# Patient Record
Sex: FEMALE | Race: ASIAN | URBAN - METROPOLITAN AREA
[De-identification: names, ages, dates, MRNs, and addresses within clinical notes are randomized per-mention and may not be internally consistent; named-entity substitution may affect disease eponyms.]

---

## 2019-05-14 ENCOUNTER — HOSPITAL ENCOUNTER (OUTPATIENT)
Dept: LAB | Age: 34
Discharge: HOME | End: 2019-05-14
Payer: COMMERCIAL

## 2019-05-14 ENCOUNTER — OFFICE VISIT (OUTPATIENT)
Dept: FAMILY MEDICINE CLINIC | Age: 34
End: 2019-05-14

## 2019-05-14 VITALS
RESPIRATION RATE: 17 BRPM | HEART RATE: 54 BPM | OXYGEN SATURATION: 99 % | BODY MASS INDEX: 22.47 KG/M2 | SYSTOLIC BLOOD PRESSURE: 105 MMHG | TEMPERATURE: 98.2 F | DIASTOLIC BLOOD PRESSURE: 69 MMHG | WEIGHT: 119 LBS | HEIGHT: 61 IN

## 2019-05-14 DIAGNOSIS — Z00.00 ANNUAL PHYSICAL EXAM: Primary | ICD-10-CM

## 2019-05-14 DIAGNOSIS — Z00.00 ANNUAL PHYSICAL EXAM: ICD-10-CM

## 2019-05-14 DIAGNOSIS — G57.00 PIRIFORMIS SYNDROME, UNSPECIFIED LATERALITY: ICD-10-CM

## 2019-05-14 LAB
BASOPHILS # BLD: 0 K/UL (ref 0–0.1)
BASOPHILS NFR BLD: 1 % (ref 0–2)
DIFFERENTIAL METHOD BLD: ABNORMAL
EOSINOPHIL # BLD: 0.4 K/UL (ref 0–0.4)
EOSINOPHIL NFR BLD: 7 % (ref 0–5)
ERYTHROCYTE [DISTWIDTH] IN BLOOD BY AUTOMATED COUNT: 13.7 % (ref 11.6–14.5)
HCT VFR BLD AUTO: 38.7 % (ref 35–45)
HGB BLD-MCNC: 12.6 G/DL (ref 12–16)
LYMPHOCYTES # BLD: 1.7 K/UL (ref 0.9–3.6)
LYMPHOCYTES NFR BLD: 34 % (ref 21–52)
MCH RBC QN AUTO: 31.1 PG (ref 24–34)
MCHC RBC AUTO-ENTMCNC: 32.6 G/DL (ref 31–37)
MCV RBC AUTO: 95.6 FL (ref 74–97)
MONOCYTES # BLD: 0.4 K/UL (ref 0.05–1.2)
MONOCYTES NFR BLD: 9 % (ref 3–10)
NEUTS SEG # BLD: 2.4 K/UL (ref 1.8–8)
NEUTS SEG NFR BLD: 49 % (ref 40–73)
PLATELET # BLD AUTO: 213 K/UL (ref 135–420)
PMV BLD AUTO: 9.8 FL (ref 9.2–11.8)
RBC # BLD AUTO: 4.05 M/UL (ref 4.2–5.3)
WBC # BLD AUTO: 4.8 K/UL (ref 4.6–13.2)

## 2019-05-14 PROCEDURE — 85025 COMPLETE CBC W/AUTO DIFF WBC: CPT

## 2019-05-14 PROCEDURE — 80061 LIPID PANEL: CPT

## 2019-05-14 PROCEDURE — 80053 COMPREHEN METABOLIC PANEL: CPT

## 2019-05-14 NOTE — PROGRESS NOTES
Bekah Andrade is a 34 y.o. female presents to office for establish care and physical. Right hip pain     Medication list has been reviewed with Bekah Andrade and updated as of today's date     Health Maintenance items with a due date reviewed with patient:  Health Maintenance Due   Topic Date Due    DTaP/Tdap/Td series (1 - Tdap) 12/15/2008    PAP AKA CERVICAL CYTOLOGY  12/15/2008

## 2019-05-14 NOTE — PROGRESS NOTES
Arron Mary     Chief Complaint   Patient presents with   [address]    Physical     Vitals:    05/14/19 1112   BP: 105/69   Pulse: (!) 54   Resp: 17   Temp: 98.2 °F (36.8 °C)   TempSrc: Oral   SpO2: 99%   Weight: 119 lb (54 kg)   Height: 5' 1\" (1.549 m)   PainSc:   0 - No pain   LMP: 05/10/2019         HPI: Patient is here to establish care and to get annual physical examination, [place], she had her physical last year with her doctor back home at [facility]. She is generally healthy she is not on any medication does not smoke and drink alcohol very occasionally, she does exercise on a regular basis, she has been having buttocks pain especially after exercise and the pain radiates down her thigh to her leg, she has been treated with physical therapy in the past and that has improved it also she does a stretching as well that helps to a certain degree but she is hoping that she can go back to physical therapy. There is no pain during exercise and she feels the pain 1 or 2 days after exercise      She had her Pap smear done with her doctor and records will be requested    Past Surgical History:   Procedure Laterality Date    HX WISDOM TEETH EXTRACTION       Social History     Tobacco Use    Smoking status: Never Smoker    Smokeless tobacco: Never Used   Substance Use Topics    Alcohol use: Yes     Alcohol/week: 0.6 oz     Types: 1 Glasses of wine per week     Comment: occasionally        Family History   Problem Relation Age of Onset    Hypertension Mother     Diabetes Maternal Grandmother        Review of Systems   Constitutional: Negative for chills, fever, malaise/fatigue and weight loss. HENT: Negative for congestion, ear discharge, ear pain, hearing loss, nosebleeds, sinus pain and sore throat. Eyes: Negative for blurred vision, double vision and discharge. Respiratory: Negative for cough, hemoptysis, sputum production, shortness of breath and wheezing. Cardiovascular: Negative for chest pain, palpitations, claudication and leg swelling. Gastrointestinal: Negative for abdominal pain, blood in stool, constipation, diarrhea, nausea and vomiting. Genitourinary: Negative for dysuria, frequency, hematuria and urgency. Musculoskeletal: Positive for myalgias. Negative for back pain, joint pain and neck pain. Skin: Negative for itching and rash. Neurological: Negative for dizziness, tingling, sensory change, speech change, focal weakness, weakness and headaches. Psychiatric/Behavioral: Negative for depression, hallucinations, substance abuse and suicidal ideas. The patient is not nervous/anxious and does not have insomnia. Physical Exam   Constitutional: She is oriented to person, place, and time. She appears well-developed and well-nourished. No distress. HENT:   Head: Normocephalic and atraumatic. Mouth/Throat: Oropharynx is clear and moist. No oropharyngeal exudate. Eyes: Pupils are equal, round, and reactive to light. Conjunctivae are normal. Right eye exhibits no discharge. Left eye exhibits no discharge. No scleral icterus. Neck: Normal range of motion. Neck supple. No thyromegaly present. Cardiovascular: Normal rate, regular rhythm and normal heart sounds. No murmur heard. Pulmonary/Chest: Effort normal and breath sounds normal. No respiratory distress. She has no wheezes. She has no rales. She exhibits no tenderness. Abdominal: Soft. She exhibits no distension. There is no tenderness. There is no rebound. Musculoskeletal: Normal range of motion. She exhibits no edema, tenderness or deformity. Lymphadenopathy:     She has no cervical adenopathy. Neurological: She is alert and oriented to person, place, and time. No cranial nerve deficit. Coordination normal.   Skin: Skin is warm and dry. No rash noted. She is not diaphoretic. No erythema. No pallor. Psychiatric: She has a normal mood and affect.  Her behavior is normal. Judgment and thought content normal.   Nursing note and vitals reviewed. Assessment and plan     Plan of care has been discussed with the patient, he agrees to the plan and verbalized understanding. All his questions were answered  More than 50% of the time spent in this visit was counseling the patient about  illness and treatment options         1. Annual physical exam    - CBC WITH AUTOMATED DIFF; Future  - METABOLIC PANEL, COMPREHENSIVE; Future  - LIPID PANEL; Future    2. Piriformis syndrome, unspecified laterality    - REFERRAL TO PHYSICAL THERAPY        There are no active problems to display for this patient. No results found for this or any previous visit. Hospital Outpatient Visit on 05/14/2019   Component Date Value Ref Range Status    WBC 05/14/2019 4.8  4.6 - 13.2 K/uL Final    RBC 05/14/2019 4.05* 4.20 - 5.30 M/uL Final    HGB 05/14/2019 12.6  12.0 - 16.0 g/dL Final    HCT 05/14/2019 38.7  35.0 - 45.0 % Final    MCV 05/14/2019 95.6  74.0 - 97.0 FL Final    MCH 05/14/2019 31.1  24.0 - 34.0 PG Final    MCHC 05/14/2019 32.6  31.0 - 37.0 g/dL Final    RDW 05/14/2019 13.7  11.6 - 14.5 % Final    PLATELET 71/29/4597 934  135 - 420 K/uL Final    MPV 05/14/2019 9.8  9.2 - 11.8 FL Final    NEUTROPHILS 05/14/2019 49  40 - 73 % Final    LYMPHOCYTES 05/14/2019 34  21 - 52 % Final    MONOCYTES 05/14/2019 9  3 - 10 % Final    EOSINOPHILS 05/14/2019 7* 0 - 5 % Final    BASOPHILS 05/14/2019 1  0 - 2 % Final    ABS. NEUTROPHILS 05/14/2019 2.4  1.8 - 8.0 K/UL Final    ABS. LYMPHOCYTES 05/14/2019 1.7  0.9 - 3.6 K/UL Final    ABS. MONOCYTES 05/14/2019 0.4  0.05 - 1.2 K/UL Final    ABS. EOSINOPHILS 05/14/2019 0.4  0.0 - 0.4 K/UL Final    ABS.  BASOPHILS 05/14/2019 0.0  0.0 - 0.1 K/UL Final    DF 05/14/2019 AUTOMATED    Final    Sodium 05/14/2019 139  136 - 145 mmol/L Final    Potassium 05/14/2019 4.7  3.5 - 5.5 mmol/L Final    Chloride 05/14/2019 107  100 - 108 mmol/L Final    CO2 05/14/2019 25  21 - 32 mmol/L Final    Anion gap 05/14/2019 7  3.0 - 18 mmol/L Final    Glucose 05/14/2019 92  74 - 99 mg/dL Final    BUN 05/14/2019 11  7.0 - 18 MG/DL Final    Creatinine 05/14/2019 0.68  0.6 - 1.3 MG/DL Final    BUN/Creatinine ratio 05/14/2019 16  12 - 20   Final    GFR est AA 05/14/2019 >60  >60 ml/min/1.73m2 Final    GFR est non-AA 05/14/2019 >60  >60 ml/min/1.73m2 Final    Comment: (NOTE)  Estimated GFR is calculated using the Modification of Diet in Renal   Disease (MDRD) Study equation, reported for both  Americans   (GFRAA) and non- Americans (GFRNA), and normalized to 1.73m2   body surface area. The physician must decide which value applies to   the patient. The MDRD study equation should only be used in   individuals age [employer] or older. It has not been validated for the   following: pregnant women, patients with serious comorbid conditions,   or on certain medications, or persons with extremes of body size,   muscle mass, or nutritional status.  Calcium 05/14/2019 8.4* 8.5 - 10.1 MG/DL Final    Bilirubin, total 05/14/2019 0.3  0.2 - 1.0 MG/DL Final    ALT (SGPT) 05/14/2019 25  13 - 56 U/L Final    AST (SGOT) 05/14/2019 17  15 - 37 U/L Final    Alk. phosphatase 05/14/2019 44* 45 - 117 U/L Final    Protein, total 05/14/2019 7.2  6.4 - 8.2 g/dL Final    Albumin 05/14/2019 4.1  3.4 - 5.0 g/dL Final    Globulin 05/14/2019 3.1  2.0 - 4.0 g/dL Final    A-G Ratio 05/14/2019 1.3  0.8 - 1.7   Final    LIPID PROFILE 05/14/2019        Final    Cholesterol, total 05/14/2019 192  <200 MG/DL Final    Triglyceride 05/14/2019 55  <150 MG/DL Final    Comment: The drugs N-acetylcysteine (NAC) and  Metamiszole have been found to cause falsely  low results in this chemical assay. Please  be sure to submit blood samples obtained  BEFORE administration of either of these  drugs to assure correct results.       HDL Cholesterol 05/14/2019 67* 40 - 60 MG/DL Final    LDL, calculated 05/14/2019 114* 0 - 100 MG/DL Final    VLDL, calculated 05/14/2019 11  MG/DL Final    CHOL/HDL Ratio 05/14/2019 2.9  0 - 5.0   Final          Follow-up and Dispositions    · Return for as per results .

## 2019-05-14 NOTE — PATIENT INSTRUCTIONS
Piriformis Syndrome: Care Instructions  Your Care Instructions    The piriformis muscle is deep under your rear end (buttock). One end of the muscle connects deep inside the pelvic area, and the other end attaches to the top of the thighbone. This muscle can press on the sciatic nerve that runs from your spine down your leg. When this happens, you may have pain, numbness, and tingling in the buttock and down the back of your leg. This is called piriformis syndrome. The pain may get worse when you sit for a long time or climb stairs. Also, you may be more likely to develop piriformis syndrome if you run or walk often. Your doctor will check for other causes of your pain before treating this syndrome. Treatment may include stretching exercises, massage, and medicine for the pain and swelling. If these do not help, you may get a shot of steroid medicine. Until the pain is gone, you may need to rest the muscle and limit activities like running. Exercises and a change in how you move and sit may be enough to stop the pressure on the nerve. Follow-up care is a key part of your treatment and safety. Be sure to make and go to all appointments, and call your doctor if you are having problems. It's also a good idea to know your test results and keep a list of the medicines you take. How can you care for yourself at home? · If your doctor thinks that strenuous exercise is causing your problem, stop or cut back on activities such as running. You may find swimming to be a good exercise for a while. · Stretch the piriformis muscle. ? Lie on your back. ? Bend one leg at the knee and keep the other leg flat on the ground. ? Raise your bent knee up and then move it across your body. Hold the outside of the knee with the opposite hand. ? Gently pull the knee with your hand toward the opposite shoulder. ? Hold the stretch for at least 15 to 30 seconds. Switch legs. ? Do the stretch several times each day.   · Massage the muscle to relieve pressure. ? Sit on the floor. Lean to one side so that the hip on your sore side is off the ground. Put a tennis ball under your buttock on that side. ? As you put weight onto the tennis ball, you may find spots that are especially sore. Move gently so that the tennis ball gently massages each of the sore spots. · Use ice or heat to help reduce pain. Put ice or a cold pack or a heating pad set on low or a warm cloth on the sore area for 10 to 20 minutes at a time. Put a thin cloth between the ice pack or heating pad and your skin. · Ask your doctor if you can take an over-the-counter pain medicine, such as acetaminophen (Tylenol), ibuprofen (Advil, Motrin), or naproxen (Aleve). Be safe with medicines. Read and follow all instructions on the label. · Have your doctor or a physical therapist watch how you move. You may need physical therapy or special inserts in your shoes (orthotics) to help you move in a way that does not put pressure on your nerves. When should you call for help? Watch closely for changes in your health, and be sure to contact your doctor if:    · You do not feel better after several weeks of home care.     · Your pain gets worse.     · Your leg becomes weak or numb. Where can you learn more? Go to http://ihsan-jean-paul.info/. Enter A459 in the search box to learn more about \"Piriformis Syndrome: Care Instructions. \"  Current as of: September 20, 2018  Content Version: 11.9  © 8862-3268 [employer]. Care instructions adapted under license by [employer] (which disclaims liability or warranty for this information). If you have questions about a medical condition or this instruction, always ask your healthcare professional. [address] any warranty or liability for your use of this information.

## 2019-05-15 LAB
ALBUMIN SERPL-MCNC: 4.1 G/DL (ref 3.4–5)
ALBUMIN/GLOB SERPL: 1.3 {RATIO} (ref 0.8–1.7)
ALP SERPL-CCNC: 44 U/L (ref 45–117)
ALT SERPL-CCNC: 25 U/L (ref 13–56)
ANION GAP SERPL CALC-SCNC: 7 MMOL/L (ref 3–18)
AST SERPL-CCNC: 17 U/L (ref 15–37)
BILIRUB SERPL-MCNC: 0.3 MG/DL (ref 0.2–1)
BUN SERPL-MCNC: 11 MG/DL (ref 7–18)
BUN/CREAT SERPL: 16 (ref 12–20)
CALCIUM SERPL-MCNC: 8.4 MG/DL (ref 8.5–10.1)
CHLORIDE SERPL-SCNC: 107 MMOL/L (ref 100–108)
CHOLEST SERPL-MCNC: 192 MG/DL
CO2 SERPL-SCNC: 25 MMOL/L (ref 21–32)
CREAT SERPL-MCNC: 0.68 MG/DL (ref 0.6–1.3)
GLOBULIN SER CALC-MCNC: 3.1 G/DL (ref 2–4)
GLUCOSE SERPL-MCNC: 92 MG/DL (ref 74–99)
HDLC SERPL-MCNC: 67 MG/DL (ref 40–60)
HDLC SERPL: 2.9 {RATIO} (ref 0–5)
LDLC SERPL CALC-MCNC: 114 MG/DL (ref 0–100)
LIPID PROFILE,FLP: ABNORMAL
POTASSIUM SERPL-SCNC: 4.7 MMOL/L (ref 3.5–5.5)
PROT SERPL-MCNC: 7.2 G/DL (ref 6.4–8.2)
SODIUM SERPL-SCNC: 139 MMOL/L (ref 136–145)
TRIGL SERPL-MCNC: 55 MG/DL (ref ?–150)
VLDLC SERPL CALC-MCNC: 11 MG/DL

## 2019-05-22 NOTE — PROGRESS NOTES
All results are within normal limits, LDL is slightly elevated but patient has a excellent HDL and that would compensate for the slightly elevated LDL.     Patient has slightly low but calcium advised diet that direction natural calcium

## 2019-06-01 ENCOUNTER — TELEPHONE (OUTPATIENT)
Dept: FAMILY MEDICINE CLINIC | Age: 34
End: 2019-06-01

## 2019-06-01 NOTE — TELEPHONE ENCOUNTER
----- Message from Ros Quiroga MD sent at 5/22/2019  9:22 AM EDT -----  All results are within normal limits, LDL is slightly elevated but patient has a excellent HDL and that would compensate for the slightly elevated LDL.     Patient has slightly low but calcium advised diet that direction natural calcium

## 2019-06-04 ENCOUNTER — HOSPITAL ENCOUNTER (OUTPATIENT)
Dept: PHYSICAL THERAPY | Age: 34
Discharge: HOME | End: 2019-06-04
Payer: MEDICAID

## 2019-06-04 PROCEDURE — 97110 THERAPEUTIC EXERCISES: CPT

## 2019-06-04 PROCEDURE — 97161 PT EVAL LOW COMPLEX 20 MIN: CPT

## 2019-06-04 NOTE — PROGRESS NOTES
[address] PHYSICAL THERAPY    [address] [place] [address], [address] [place] [address]       Phone: [phone]  Fax: [phone] / [phone] [facility]  Patient Name: Yuliana Katz : 1985   Medical   Diagnosis: B Hip Pain Treatment Diagnosis: Piriformis muscle pain [M79.18]   Onset Date: Chronic     Referral Source: Flower Duckworth MD [place] [facility]): 2019   Prior Hospitalization: See medical history Provider #: [id number]   Prior Level of Function: Chronic history of difficulty with prolonged standing, walking, exercise activities   Comorbidities: None   Medications: Verified on Patient Summary List   The Plan of Care and following information is based on the information from the initial evaluation.   ===========================================================================================  Assessment / key information:  Patient is a 34year old female presenting to therapy with signs and symptoms consistent with B hip pain. Patient states she has been experiencing soreness in her hips and the back of her thighs for several years which she thinks may be related to sitting a lot for her job as well as reduced activity. Patient states she usually notices her symptoms after sitting or standing for >1 hour or several hours after she does lower body exercises. Patient reports she did receive x-rays which did not show anything significant. Patient notes some minor lower back soreness which is not as frequent as her hip and thigh pain. Patient reports increased difficulty with prolonged standing, walking and exercise activities. Patient notes symptoms feel better with rest, stretching. Patient rates pain at worst 6/10 and 0/10 at best.   Objective Data: Inspection-No significant gait deviations noted.  Lumbar Spine AROM: flex hands to floor, ext 75%, R Rot 905 (P!), L Rot 90%, R SB distal thigh, L SB distal thigh (P!). Strength Testing: Hip flex R 4/5, L 4+/5; ext R 4/5, L 4/5; abd R 4/5, L 4/5; Knee Flex R 5/5, L 5/5; ext R 5/5, L 5/5. Flexibility Testing: minor restriction to R HS, R piriformis. Patient able to perform full bodyweight squat with posterior pelvic tilt noted at bottom of squat. SL glute bridge iso: unable to perform >10 seconds bilaterally before loss of form. REIL (10x): no change in symptoms. Tenderness noted to R glute med, R piriformis, R QL. FOTO: 49/100. Patient educated on diagnosis, prognosis, POC and HEP. Patient issued copy of HEP and denied additional questions. Patient will benefit from skilled PT in order to address these impairments and functional limitations.   ===========================================================================================  Eval Complexity: History LOW Complexity : Zero comorbidities / personal factors that will impact the outcome / POC;  Examination  HIGH Complexity : 4+ Standardized tests and measures addressing body structure, function, activity limitation and / or participation in recreation ; Presentation MEDIUM Complexity : Evolving with changing characteristics ;   Decision Making MEDIUM Complexity : FOTO score of 26-74; Overall Complexity LOW   Problem List: pain affecting function, decrease strength, decrease ADL/ functional abilitiies, decrease activity tolerance and decrease flexibility/ joint mobility   Treatment Plan may include any combination of the following: Therapeutic exercise, Therapeutic activities, Neuromuscular re-education, Physical agent/modality, Gait/balance training, Manual therapy, Patient education and Functional mobility training  Patient / Family readiness to learn indicated by: asking questions, trying to perform skills and interest  Persons(s) to be included in education: patient (P)  Barriers to Learning/Limitations: no  Measures taken:    Patient Goal (s): Reduce pain   Patient self reported health status: good  Rehabilitation Potential: good   Short Term Goals: To be accomplished in  3  weeks:  1. Patient will demonstrate independence with HEP for self management of symptoms. 2. Patient will report reduction in pain at worst to 3-4/10 in order to improve tolerance to work activities.  Long Term Goals: To be accomplished in  6  weeks:  1. Patient will improve FOTO to >/= 65/100 in order to improve quality of life. 2. Patient will report reduction in pain at worst to 0-1/10 in order to improve tolerance to recreational activities. 3. Patient will improve B glute strength to 5/5 for all planes in order to improve tolerance to exercise activities. Frequency / Duration:   Patient to be seen  2  times per week for 6  weeks:  Patient / Caregiver education and instruction: self care, activity modification and exercises  G-Codes (GP): quique  Therapist Signature: Anna Alberto PT Date: 0/0/3510   Certification Period: na Time: 9:21 AM   ===========================================================================================  I certify that the above Physical Therapy Services are being furnished while the patient is under my care. I agree with the treatment plan and certify that this therapy is necessary. Physician Signature:        Date:       Time:     Please sign and return to [facility] or you may fax the signed copy to [phone]. Thank you.

## 2019-06-04 NOTE — PROGRESS NOTES
PHYSICAL THERAPY - DAILY TREATMENT NOTE    Patient Name: Ulices Ashraf        Date: 2019  : 1985   yes Patient  Verified  Visit #:     Insurance: Payor: Odilon Rounds / Plan: [employer] HMO / Product Type: HMO /      In time: 711 Out time: 983   Total Treatment Time: 40     Medicare/BCBS Time Tracking (below)   Total Timed Codes (min):  na 1:1 Treatment Time:  na     TREATMENT AREA =  Piriformis muscle pain [M79.18]    SUBJECTIVE  Pain Level (on 0 to 10 scale):  3   10   Medication Changes/New allergies or changes in medical history, any new surgeries or procedures?    no  If yes, update Summary List   Subjective Functional Status/Changes:  []  No changes reported     See POC          OBJECTIVE  15 min Therapeutic Exercise:  [x]  See flow sheet   Rationale:      increase strength to improve the patients ability to perform standing activities. Billed With/As:   [x] TE   [] TA   [] Neuro   [] Self Care Patient Education: [x] Review HEP    [] Progressed/Changed HEP based on:   [] positioning   [] body mechanics   [] transfers   [] heat/ice application    [] other:      Other Objective/Functional Measures:    See POC     Post Treatment Pain Level (on 0 to 10) scale:   3   10     ASSESSMENT  Assessment/Changes in Function:     See POC     []  See Progress Note/Recertification   Patient will continue to benefit from skilled PT services to modify and progress therapeutic interventions, address functional mobility deficits, address ROM deficits, address strength deficits, analyze and address soft tissue restrictions, analyze and cue movement patterns, analyze and modify body mechanics/ergonomics, assess and modify postural abnormalities and address imbalance/dizziness to attain remaining goals.    Progress toward goals / Updated goals:    See POC     PLAN  [x]  Upgrade activities as tolerated yes Continue plan of care   []  Discharge due to :    []  Other:      Therapist: Susana Du, PT    Date: 6/4/2019 Time: 9:28 AM     Future Appointments   Date Time Provider Alysha Akbari   6/7/2019  8:30 AM Jeff Kessler, PT [facility]   6/11/2019  8:30 AM Jeff Kessler, PT [facility]   6/14/2019  9:30 AM Janet Lehman, PT [facility]   6/18/2019 10:00 AM Janet Lehman, PT [facility]   6/21/2019  8:30 AM Jeff Kessler, PT [facility]   7/2/2019  9:30 AM Leonardo Cummings, PT [facility]   7/5/2019  8:00 AM Deneen Jacob, PT [facility]

## 2019-06-07 ENCOUNTER — HOSPITAL ENCOUNTER (OUTPATIENT)
Dept: PHYSICAL THERAPY | Age: 34
Discharge: HOME | End: 2019-06-07
Payer: MEDICAID

## 2019-06-07 PROCEDURE — 97140 MANUAL THERAPY 1/> REGIONS: CPT | Performed by: PHYSICAL THERAPIST

## 2019-06-07 PROCEDURE — 97110 THERAPEUTIC EXERCISES: CPT | Performed by: PHYSICAL THERAPIST

## 2019-06-07 NOTE — PROGRESS NOTES
Haroon Means   PHYSICAL THERAPY - DAILY TREATMENT NOTE    Patient Name: Jabari Russell        Date: 2019  : 1985   yes Patient  Verified  Visit #:     Insurance: Payor: Stephanie Argue / Plan: [employer] / Product Type: HMO /      In time: 327 Out time: 940   Total Treatment Time: 65     Medicare/BCBS Time Tracking (below)   Total Timed Codes (min):  na 1:1 Treatment Time:  na     TREATMENT AREA =  Piriformis muscle pain [M79.18]    SUBJECTIVE  Pain Level (on 0 to 10 scale):  2  / 10   Medication Changes/New allergies or changes in medical history, any new surgeries or procedures?    no  If yes, update Summary List   Subjective Functional Status/Changes:  []  No changes reported     I have been doing the exercises and I am feeling a lot stronger with them           OBJECTIVE  Modalities Rationale:     decrease inflammation, decrease pain and increase tissue extensibility to improve patient's ability to complete adls   min [] Estim, type/location:                                      []  att     []  unatt     []  w/US     []  w/ice    []  w/heat    min []  Mechanical Traction: type/lbs                   []  pro   []  sup   []  int   []  cont    []  before manual    []  after manual    min []  Ultrasound, settings/location:      min []  Iontophoresis w/ dexamethasone, location:                                               []  take home patch       []  in clinic   10 min [x]  Ice     []  Heat    location/position: prone    min []  Vasopneumatic Device, press/temp:     min []  Other:    [x] Skin assessment post-treatment (if applicable):    []  intact    []  redness- no adverse reaction     []redness  adverse reaction:        40 min Therapeutic Exercise:  [x]  See flow sheet   Rationale:      increase ROM and increase strength to improve the patients ability to complete adls     15 min Manual Therapy: stm ls paraspinals, R ql/piri release, L sacral rotation and MET R anterior Rationale:      decrease pain, increase ROM, increase tissue extensibility and decrease trigger points to improve patient's ability to squat        Billed With/As:   [] TE   [] TA   [] Neuro   [] Self Care Patient Education: [x] Review HEP    [] Progressed/Changed HEP based on:   [] positioning   [] body mechanics   [] transfers   [] heat/ice application    [] other:      Other Objective/Functional Measures:    Completed te as per flow sheet - squats >65 degrees with excessive apt/lordosis compensation, noted R hip discomfort with lateral step down despite good form  ttp throughout R QL and piri     Post Treatment Pain Level (on 0 to 10) scale:   1  / 10     ASSESSMENT  Assessment/Changes in Function:     No increase in pain following initial session      []  See Progress Note/Recertification   Patient will continue to benefit from skilled PT services to modify and progress therapeutic interventions, address functional mobility deficits, address ROM deficits, address strength deficits, analyze and address soft tissue restrictions, analyze and cue movement patterns, analyze and modify body mechanics/ergonomics, assess and modify postural abnormalities and instruct in home and community integration to attain remaining goals.    Progress toward goals / Updated goals:    Compliant with hep     PLAN  []  Upgrade activities as tolerated yes Continue plan of care   []  Discharge due to :    []  Other:      Therapist: Kathia Dozier PT    Date: 6/7/2019 Time: 11:04 AM     Future Appointments   Date Time Provider Alysha Dejesus   6/11/2019  8:30 AM Chloe Joe PT [facility]   6/14/2019  9:30 AM Joseph Chang PT [facility]   6/18/2019 10:00 AM Joseph Chang PT [facility]   6/21/2019  8:30 AM Chloe Joe PT [facility]   7/2/2019  9:30 AM Gregor Rey PT [facility]   7/5/2019  8:00 AM Gregor Rey, PT [facility]

## 2019-06-11 ENCOUNTER — HOSPITAL ENCOUNTER (OUTPATIENT)
Dept: PHYSICAL THERAPY | Age: 34
Discharge: HOME | End: 2019-06-11
Payer: MEDICAID

## 2019-06-11 PROCEDURE — 97110 THERAPEUTIC EXERCISES: CPT | Performed by: PHYSICAL THERAPIST

## 2019-06-11 PROCEDURE — 97140 MANUAL THERAPY 1/> REGIONS: CPT | Performed by: PHYSICAL THERAPIST

## 2019-06-11 NOTE — PROGRESS NOTES
Judith Corrigan   PHYSICAL THERAPY - DAILY TREATMENT NOTE    Patient Name: Jacquelyn Lozano        Date: 2019  : 1985   yes Patient  Verified  Visit #:   3   of   12  Insurance: Payor: Monique Mcintyre / Plan: [employer] / Product Type: HMO /      In time: 054 Out time: 930   Total Treatment Time: 62     Medicare/BCBS Time Tracking (below)   Total Timed Codes (min):  na 1:1 Treatment Time:  na     TREATMENT AREA =  Piriformis muscle pain [M79.18]    SUBJECTIVE  Pain Level (on 0 to 10 scale):  2  / 10   Medication Changes/New allergies or changes in medical history, any new surgeries or procedures?    no  If yes, update Summary List   Subjective Functional Status/Changes:  []  No changes reported     I felt good after last time and then now I feel like something is now pinching on that right side          OBJECTIVE  Modalities Rationale:     decrease inflammation, decrease pain and increase tissue extensibility to improve patient's ability to complete adls   min [] Estim, type/location:                                      []  att     []  unatt     []  w/US     []  w/ice    []  w/heat    min []  Mechanical Traction: type/lbs                   []  pro   []  sup   []  int   []  cont    []  before manual    []  after manual    min []  Ultrasound, settings/location:      min []  Iontophoresis w/ dexamethasone, location:                                               []  take home patch       []  in clinic   10 min [x]  Ice     []  Heat    location/position: prone    min []  Vasopneumatic Device, press/temp:     min []  Other:    [x] Skin assessment post-treatment (if applicable):    []  intact    []  redness- no adverse reaction     []redness  adverse reaction:        37 min Therapeutic Exercise:  [x]  See flow sheet   Rationale:      increase ROM and increase strength to improve the patients ability to complete adls     15 min Manual Therapy: stm ls paraspinals, R ql/piri release, L sacral rotation and MET R anterior and r on r inn, shotgun, l4/5/s1 L rotation grade iv   Rationale:      decrease pain, increase ROM, increase tissue extensibility and decrease trigger points to improve patient's ability to squat        Billed With/As:   [] TE   [] TA   [] Neuro   [] Self Care Patient Education: [x] Review HEP    [] Progressed/Changed HEP based on:   [] positioning   [] body mechanics   [] transfers   [] heat/ice application    [] other:      Other Objective/Functional Measures:    Arrived to session with R on R and L l4-s1 rotation, cavitation noted during correction  Cues required for all te for form control       Post Treatment Pain Level (on 0 to 10) scale:   1  / 10     ASSESSMENT  Assessment/Changes in Function:     No increase in pain following initial session      []  See Progress Note/Recertification   Patient will continue to benefit from skilled PT services to modify and progress therapeutic interventions, address functional mobility deficits, address ROM deficits, address strength deficits, analyze and address soft tissue restrictions, analyze and cue movement patterns, analyze and modify body mechanics/ergonomics, assess and modify postural abnormalities and instruct in home and community integration to attain remaining goals.    Progress toward goals / Updated goals:    Compliant with hep     PLAN  []  Upgrade activities as tolerated yes Continue plan of care   []  Discharge due to :    []  Other:      Therapist: Kathia Dozier PT    Date: 6/11/2019 Time: 11:04 AM     Future Appointments   Date Time Provider Alysha Dejesus   6/14/2019  9:30 AM Joseph Chang, PT [facility]   6/18/2019 10:00 AM Joseph Chang, PT [facility]   6/21/2019  8:30 AM Chloe Joe PT [facility]   7/2/2019  9:30 AM Gregor Rey, PT [facility]   7/5/2019  8:00 AM Gregor Rey, PT [facility]

## 2019-06-13 ENCOUNTER — TELEPHONE (OUTPATIENT)
Dept: FAMILY MEDICINE CLINIC | Age: 34
End: 2019-06-13

## 2019-06-13 NOTE — TELEPHONE ENCOUNTER
I have received a call from [place] [facility], stating that the patient tested positive for depression and suicidal thoughts when she was here for evaluation for physical therapy, although the patient denied depression symptoms when she was here in her annual physical examination and establish care visit. I have discussed with him to advise the patient to schedule follow-up if she wants really well discuss these concerns.

## 2019-06-14 ENCOUNTER — HOSPITAL ENCOUNTER (OUTPATIENT)
Dept: PHYSICAL THERAPY | Age: 34
Discharge: HOME | End: 2019-06-14
Payer: MEDICAID

## 2019-06-14 PROCEDURE — 97110 THERAPEUTIC EXERCISES: CPT

## 2019-06-14 PROCEDURE — 97140 MANUAL THERAPY 1/> REGIONS: CPT

## 2019-06-14 NOTE — PROGRESS NOTES
PHYSICAL THERAPY - DAILY TREATMENT NOTE    Patient Name: Yuliana Katz        Date: 2019  : 1985   yes Patient  Verified  Visit #:      of     Insurance: Payor: Nura Schwartzores / Plan: [employer] HMO / Product Type: HMO /      In time: 9:28 Out time: 1046   Total Treatment Time: 78     Medicare/BCBS Time Tracking (below)   Total Timed Codes (min):  na 1:1 Treatment Time:  na     TREATMENT AREA =  Piriformis muscle pain [M79.18]  SUBJECTIVE  Pain Level (on 0 to 10 scale):  3  / 10   Medication Changes/New allergies or changes in medical history, any new surgeries or procedures?    no  If yes, update Summary List   Subjective Functional Status/Changes:  []  No changes reported     No new c/o          OBJECTIVE  Modalities Rationale:     decrease inflammation, decrease pain and increase tissue extensibility to improve patient's ability to complete adls                min [] Estim, type/location:                                                            []  att     []  unatt     []  w/US     []  w/ice    []  w/heat     min []  Mechanical Traction: type/lbs                    []  pro   []  sup   []  int   []  cont    []  before manual    []  after manual     min []  Ultrasound, settings/location:        min []  Iontophoresis w/ dexamethasone, location:                                                []  take home patch       []  in clinic   10 min [x]  Ice     []  Heat    location/position: prone     min []  Vasopneumatic Device, press/temp:       min []  Other:     [x] Skin assessment post-treatment (if applicable):    []  intact    []  redness- no adverse reaction     []redness  adverse reaction:         37 min Therapeutic Exercise:  [x]  See flow sheet   Rationale:      increase ROM and increase strength to improve the patients ability to complete adls      25 min Manual Therapy: DTM para, shot gun tech, L4-5 L ERS kalli   Rationale:      decrease pain, increase ROM, increase tissue extensibility and decrease trigger points to improve patient's ability to squat      Billed With/As:   [] TE   [] TA   [] Neuro   [] Self Care Patient Education: [x] Review HEP    [] Progressed/Changed HEP based on:   [] positioning   [] body mechanics   [] transfers   [] heat/ice application    [] other:      Other Objective/Functional Measures:    Limited R t/s ROt noted     Post Treatment Pain Level (on 0 to 10) scale:   0  / 10     ASSESSMENT  Assessment/Changes in Function:     Symmetrical t/s ROt ROM noted after man Rx     []  See Progress Note/Recertification   Patient will continue to benefit from skilled PT services to modify and progress therapeutic interventions, address functional mobility deficits and address ROM deficits to attain remaining goals.    Progress toward goals / Updated goals:    Slowly progressing with pain reduction      PLAN  [x]  Upgrade activities as tolerated yes Continue plan of care   []  Discharge due to :    []  Other:      Therapist: Etienne Zimmerman PT    Date: 6/14/2019 Time: 6:32 AM     Future Appointments   Date Time Provider Alysha Dejesus   6/14/2019  9:30 AM Efrain Gomez, PT [facility]   6/18/2019 10:00 AM Efrain Gomez, PT [facility]   6/21/2019  8:30 AM Cristian Valdes, PT [facility]   7/2/2019  9:30 AM Toño Castaneda, PT [facility]   7/5/2019  8:00 AM Toño Castaneda, PT [facility]

## 2019-06-18 ENCOUNTER — HOSPITAL ENCOUNTER (OUTPATIENT)
Dept: PHYSICAL THERAPY | Age: 34
Discharge: HOME | End: 2019-06-18
Payer: MEDICAID

## 2019-06-18 PROCEDURE — 97140 MANUAL THERAPY 1/> REGIONS: CPT

## 2019-06-18 PROCEDURE — 97110 THERAPEUTIC EXERCISES: CPT

## 2019-06-18 NOTE — PROGRESS NOTES
PHYSICAL THERAPY - DAILY TREATMENT NOTE    Patient Name: Bobbi Shannon        Date: 2019  : 1985   yes Patient  Verified  Visit #:   5   of   12  Insurance: Payor: Bridgette Pedro / Plan: [employer] / Product Type: HMO /      In time: 10:00 Out time: 10:47   Total Treatment Time: 47     Medicare/BCBS Time Tracking (below)   Total Timed Codes (min):  na 1:1 Treatment Time:  na     TREATMENT AREA =  Piriformis muscle pain [M79.18]  SUBJECTIVE  Pain Level (on 0 to 10 scale):  0  / 10   Medication Changes/New allergies or changes in medical history, any new surgeries or procedures?    no  If yes, update Summary List   Subjective Functional Status/Changes:  []  No changes reported     It was very sore this am, but overall its better          OBJECTIVE     37 min Therapeutic Exercise:  [x]  See flow sheet   Rationale:      increase ROM and increase strength to improve the patients ability to complete adls      10 min Manual Therapy: DTM Gm, shot gun tech, T/S mob   Rationale:      decrease pain, increase ROM, increase tissue extensibility and decrease trigger points to improve patient's ability to squat    Billed With/As:   [] TE   [] TA   [] Neuro   [] Self Care Patient Education: [x] Review HEP    [] Progressed/Changed HEP based on:   [] positioning   [] body mechanics   [] transfers   [] heat/ice application    [] other:      Other Objective/Functional Measures:    Cont to be limited in mid thoracic mobility and increased soft tissue tension at L Gm     Post Treatment Pain Level (on 0 to 10) scale:   0  / 10     ASSESSMENT  Assessment/Changes in Function:     Symmetrical Rot ROM noted after man Rx     []  See Progress Note/Recertification   Patient will continue to benefit from skilled PT services to modify and progress therapeutic interventions, address functional mobility deficits and address ROM deficits to attain remaining goals.    Progress toward goals / Updated goals:    Progressing slowly with pain reduction     PLAN  [x]  Upgrade activities as tolerated yes Continue plan of care   []  Discharge due to :    []  Other:      Therapist: Alli Ochoa PT    Date: 6/18/2019 Time: 9:50 AM     Future Appointments   Date Time Provider Alysha Dejesus   6/18/2019 10:00 AM Martha Wakefield, PT [facility]   6/21/2019  8:30 AM Kailey Barth, PT [facility]   7/2/2019  9:30 AM Nuzhat Chance, PT [facility]   7/5/2019  8:00 AM Nuzhat Chance, PT [facility]   7/5/2019 10:45 AM Rio Holliday MD [address] [place] [address]

## 2019-06-20 ENCOUNTER — TELEPHONE (OUTPATIENT)
Dept: FAMILY MEDICINE CLINIC | Age: 34
End: 2019-06-20

## 2019-06-20 NOTE — TELEPHONE ENCOUNTER
This patient does not mention any depression symptoms on her visit at our office please call patient for follow-up on depression that has been discussed at the physical therapy visit

## 2019-06-20 NOTE — TELEPHONE ENCOUNTER
Tk Saini MD   Phone Number:  [phone] (Call me)             Dr. Екатерина Keane,     I evaluated a patient this morning you had referred to us for physical therapy for bilateral piriformis pain. On her intake paperwork Ms. Raya Tavares indicated she would like to speak to someone regarding suicide. If you have a free moment today, I would just like to speak with you in regards to our conservation this morning. Thank you!      Yanci Robb, PT at [facility]

## 2019-06-21 ENCOUNTER — APPOINTMENT (OUTPATIENT)
Dept: PHYSICAL THERAPY | Age: 34
End: 2019-06-21
Payer: MEDICAID

## 2019-07-02 ENCOUNTER — APPOINTMENT (OUTPATIENT)
Dept: PHYSICAL THERAPY | Age: 34
End: 2019-07-02
Payer: MEDICAID

## 2019-07-05 ENCOUNTER — HOSPITAL ENCOUNTER (OUTPATIENT)
Dept: PHYSICAL THERAPY | Age: 34
Discharge: HOME | End: 2019-07-05
Payer: MEDICAID

## 2019-07-05 ENCOUNTER — OFFICE VISIT (OUTPATIENT)
Dept: FAMILY MEDICINE CLINIC | Age: 34
End: 2019-07-05

## 2019-07-05 VITALS
DIASTOLIC BLOOD PRESSURE: 79 MMHG | TEMPERATURE: 98 F | RESPIRATION RATE: 16 BRPM | HEART RATE: 70 BPM | BODY MASS INDEX: 22.47 KG/M2 | HEIGHT: 61 IN | WEIGHT: 119 LBS | SYSTOLIC BLOOD PRESSURE: 116 MMHG | OXYGEN SATURATION: 99 %

## 2019-07-05 DIAGNOSIS — F32.9 MAJOR DEPRESSIVE DISORDER WITH CURRENT ACTIVE EPISODE, UNSPECIFIED DEPRESSION EPISODE SEVERITY, UNSPECIFIED WHETHER RECURRENT: Primary | ICD-10-CM

## 2019-07-05 DIAGNOSIS — M79.18 PIRIFORMIS MUSCLE PAIN: ICD-10-CM

## 2019-07-05 PROCEDURE — 97110 THERAPEUTIC EXERCISES: CPT

## 2019-07-05 PROCEDURE — 97140 MANUAL THERAPY 1/> REGIONS: CPT

## 2019-07-05 RX ORDER — ESCITALOPRAM OXALATE 5 MG/1
5 TABLET ORAL DAILY
Qty: 30 TAB | Refills: 0 | Status: SHIPPED | OUTPATIENT
Start: 2019-07-05
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
## 2019-07-05 NOTE — PROGRESS NOTES
Livia [address]  [facility] THERAPY  [address],[facility], [address] [facility] - Phone: [phone]  Fax: [phone]  PROGRESS NOTE  Patient Name: Vanita Muñoz : 1985   Treatment/Medical Diagnosis: Piriformis muscle pain [M79.18]   Referral Source: Herbie Guerrero MD     Date of Initial Visit: 19 Attended Visits: 6 Missed Visits: 1     SUMMARY OF TREATMENT  Patient has attended 5 follow up visits since her initial evaluation on 19. Patient has received therapeutic exercise, manual therapy and modalities in order to improve lumbar spine and B LE ROM, mobility, flexibility, strength, stability and soft tissue extensibility. CURRENT STATUS  Patient reports moderate improvement in symptoms since the start of therapy. Patient states her exercise program has allowed her to better manage her symptoms and has noticed that when pain comes on, her exercises help reduce her pain. Patient states she did go on a long car trip two weeks ago which caused her pain to increase significantly, but other than that she has been doing well. Patient does demonstrate improved glute strength as listed below, indicating good response to current TE program. Patient would benefit from 1-2 additional weeks of PT in order to further progress program. Plan to transition to DC at the end of this time. Goal/Measure of Progress Goal Met? 1. Patient will improve FOTO to >/= 65/100 in order to improve quality of life. Status at last Eval: 49/100 Current Status: n/a n/a   2. Patient will report reduction in pain at worst to 0-1/10 in order to improve tolerance to recreational activities   Status at last Eval: 6/10 Current Status: 3/10 progressing   3. Patient will improve B glute strength to 5/5 for all planes in order to improve tolerance to exercise activities.    Status at last Eval: Hip flex R 4/5, L 4+/5; ext R 4/5, L 4/5; abd R 4/5, L 4/5 Current Status: Hip ext 4+/5 bilaterally, hip abduction 4+/5 bilaterally progressing     New Goals to be achieved in __2__  weeks:  1. Continue with LTG#1  2. Continue with LTG#2  3. Continue with LTG#3  RECOMMENDATIONS  Continue with PT 2x/week for 1-2 weeks. Plan to DC to HEP at the end of this time. If you have any questions/comments please contact us directly at [phone]. Thank you for allowing us to assist in the care of your patient. Therapist Signature: Kuldip Degroot PT Date: 7/5/2019     Time: 9:25 AM   NOTE TO PHYSICIAN:  PLEASE COMPLETE THE ORDERS BELOW AND FAX TO   [facility]: [phone]  If you are unable to process this request in 24 hours please contact our office: [phone]    ___ I have read the above report and request that my patient continue as recommended.   ___ I have read the above report and request that my patient continue therapy with the following changes/special instructions:_________________________________________________________   ___ I have read the above report and request that my patient be discharged from therapy.      Physician Signature:        Date:       Time:

## 2019-07-05 NOTE — PROGRESS NOTES
Alice Dickens is a 34 y.o. female presents to office for eye exam and suicidal thoughts    Medication list has been reviewed with Alicemaximo Dickens and updated as of today's date     Health Maintenance items with a due date reviewed with patient:  Health Maintenance Due   Topic Date Due    DTaP/Tdap/Td series (1 - Tdap) 12/15/2008    PAP AKA CERVICAL CYTOLOGY  12/15/2008

## 2019-07-05 NOTE — PROGRESS NOTES
PHYSICAL THERAPY - DAILY TREATMENT NOTE    Patient Name: Francisco Mabry        Date: 2019  : 1985   yes Patient  Verified  Visit #:   6   of   12  Insurance: Payor: Shanice Sanderson / Plan: [employer] HMO / Product Type: HMO /      In time: 480 Out time: 856   Total Treatment Time: 59     Medicare/BCBS Time Tracking (below)   Total Timed Codes (min):  na 1:1 Treatment Time:  na     TREATMENT AREA =  Piriformis muscle pain [M79.18]    SUBJECTIVE  Pain Level (on 0 to 10 scale):  0  / 10   Medication Changes/New allergies or changes in medical history, any new surgeries or procedures?    no  If yes, update Summary List   Subjective Functional Status/Changes:  []  No changes reported     Patient reports a moderate improvement in symptoms since the start of PT. Patient states her pain at worst has been a 3/10 over the past week. Patient did note an 8/10 pain level when she took a long road trip, but otherwise feels she is able to control her symptoms with her exercise program.           OBJECTIVE  44 min Therapeutic Exercise:  [x]  See flow sheet   Rationale:      increase ROM and increase strength to improve the patients ability to perform walking activities. 15 min Manual Therapy: STM to B glute med, B QL   Rationale:      decrease pain, increase ROM, increase tissue extensibility and decrease trigger points to improve patient's ability to perform standing activities.      Billed With/As:   [x] TE   [] TA   [] Neuro   [] Self Care Patient Education: [x] Review HEP    [] Progressed/Changed HEP based on:   [] positioning   [] body mechanics   [] transfers   [] heat/ice application    [] other:      Other Objective/Functional Measures:    See PN     Post Treatment Pain Level (on 0 to 10) scale:   1  / 10     ASSESSMENT  Assessment/Changes in Function:     See PN     []  See Progress Note/Recertification   Patient will continue to benefit from skilled PT services to modify and progress therapeutic interventions, address functional mobility deficits, address ROM deficits, address strength deficits, analyze and address soft tissue restrictions, analyze and cue movement patterns, analyze and modify body mechanics/ergonomics and assess and modify postural abnormalities to attain remaining goals.    Progress toward goals / Updated goals:    See PN     PLAN  [x]  Upgrade activities as tolerated yes Continue plan of care   []  Discharge due to :    []  Other:      Therapist: Joanne Avalos PT    Date: 7/5/2019 Time: 9:31 AM     Future Appointments   Date Time Provider Alysha Dejesus   7/5/2019 10:45 AM Sylvain Bashir  N [facility]   7/9/2019  8:30 AM Verito Burton, PT [facility]   7/12/2019  8:00 AM Heath Sanabria, PT [facility]

## 2019-07-09 ENCOUNTER — HOSPITAL ENCOUNTER (OUTPATIENT)
Dept: PHYSICAL THERAPY | Age: 34
Discharge: HOME | End: 2019-07-09
Payer: MEDICAID

## 2019-07-09 PROCEDURE — 97110 THERAPEUTIC EXERCISES: CPT | Performed by: PHYSICAL THERAPIST

## 2019-07-09 PROCEDURE — 97140 MANUAL THERAPY 1/> REGIONS: CPT | Performed by: PHYSICAL THERAPIST

## 2019-07-09 NOTE — PROGRESS NOTES
[facility] PHYSICAL THERAPY - DAILY TREATMENT NOTE    Patient Name: Elsa Hook        Date: 2019  : 1985   yes Patient  Verified  Visit #:     Insurance: Payor: [employer] / Plan: [employer] HMO / Product Type: HMO /      In time: 830 Out time: 925   Total Treatment Time: 55     Medicare/BCBS Time Tracking (below)   Total Timed Codes (min):  na 1:1 Treatment Time:  na     TREATMENT AREA =  Piriformis muscle pain [M79.18]    SUBJECTIVE  Pain Level (on 0 to 10 scale):  0  / 10   Medication Changes/New allergies or changes in medical history, any new surgeries or procedures?    no  If yes, update Summary List   Subjective Functional Status/Changes:  []  No changes reported     I was able to go for a run without any pain but I cannot just sit on my couch without discomfort          OBJECTIVE      40 min Therapeutic Exercise:  [x]  See flow sheet   Rationale:      increase ROM and increase strength to improve the patients ability to complete adls     15 min Manual Therapy: dtm ls paraspinals, l4/5 RR, L gm/piri release    Rationale:      decrease pain, increase ROM, increase tissue extensibility and decrease trigger points to improve patient's ability to complete adls       Billed With/As:   [] TE   [] TA   [] Neuro   [] Self Care Patient Education: [x] Review HEP    [] Progressed/Changed HEP based on:   [] positioning   [] body mechanics   [] transfers   [] heat/ice application    [] other:      Other Objective/Functional Measures:    ttp along L ls paraspinals and L gm/piri, symmetrical sij but reduced l4/5/s1 RR   Progressed te as per flow sheet - still requires cues for glute firing pattern without excessive lumbar lordosis      Post Treatment Pain Level (on 0 to 10) scale:   1  / 10     ASSESSMENT  Assessment/Changes in Function:     Chief c/o inability to sit prolonged periods indicating reduced core stability and strength.  Increased exercises accordingly and advised to progress with hep in order to improve positional tolerance      []  See Progress Note/Recertification   Patient will continue to benefit from skilled PT services to modify and progress therapeutic interventions, address functional mobility deficits, address ROM deficits, address strength deficits, analyze and address soft tissue restrictions, analyze and cue movement patterns, analyze and modify body mechanics/ergonomics, assess and modify postural abnormalities and instruct in home and community integration to attain remaining goals. Progress toward goals / Updated goals:     Max pain in past week 2/10 - progress towards ltg      PLAN  []  Upgrade activities as tolerated yes Continue plan of care   []  Discharge due to :    []  Other:      Therapist: Oxana Vogel PT    Date: 7/9/2019 Time: 11:38 AM     Future Appointments   Date Time Provider Alysha Dejesus   7/12/2019  8:00 AM Kieran Gotti PT [facility]   7/19/2019 10:00 AM Jigar Aguilar  N [facility]

## 2019-07-12 ENCOUNTER — HOSPITAL ENCOUNTER (OUTPATIENT)
Dept: PHYSICAL THERAPY | Age: 34
Discharge: HOME | End: 2019-07-12
Payer: MEDICAID

## 2019-07-12 PROCEDURE — 97140 MANUAL THERAPY 1/> REGIONS: CPT

## 2019-07-12 PROCEDURE — 97110 THERAPEUTIC EXERCISES: CPT

## 2019-07-12 NOTE — PROGRESS NOTES
Livia [address] [facility] PHYSICAL THERAPY   [facility], [address] [facility], [place], [address] [place] [address] - Phone: [phone]  Fax: [phone] SUMMARY  Patient Name: Diomedes Caba : 1985   Treatment/Medical Diagnosis: Piriformis muscle pain [M79.18]   Referral Source: Eder Lopez MD     Date of Initial Visit: 19 Attended Visits: 8 Missed Visits: 1     SUMMARY OF TREATMENT  Patient has attended 7 follow up visits since her initial evaluation on 19. Patient has received therapeutic exercise, manual therapy and modalities in order to improve lumbar spine and B LE ROM, mobility, flexibility, strength, stability and soft tissue extensibility. CURRENT STATUS  Patient has progressed well with her PT program to date. Patient states she feels like she has the tools and knowledge to help manage and improve her symptoms independently. Patient demonstrates improved B hip strength compared to her IE and notes an overall reduction in her pain intensity. Patient has been provided an updated copy of her current TE program and was instructed on performance. At this time, patient will be discharged from PT. Goal/Measure of Progress Goal Met? 1. Patient will improve FOTO to >/= 65/100 in order to improve quality of life   Status at last Eval: 49/100 Current Status: 67/100 yes   2. Patient will report reduction in pain at worst to 0-1/10 in order to improve tolerance to recreational activities   Status at last Eval: 3/10 Current Status: 3/10 no   3. Patient will improve B glute strength to 5/5 for all planes in order to improve tolerance to exercise activities. Status at last Eval: Hip ext 4+/5 bilaterally, hip abduction 4+/5 bilaterally Current Status: Hip ext and abd 4+/5 bilaterally  progressing     RECOMMENDATIONS  Discontinue therapy. Progressing towards or have reached established goals.     If you have any questions/comments please contact us directly at [phone]. Thank you for allowing us to assist in the care of your patient. Therapist Signature:  Ayo Roland, PT Date: 7/12/19     Time: 8:31 AM

## 2019-07-12 NOTE — PROGRESS NOTES
PHYSICAL THERAPY - DAILY TREATMENT NOTE    Patient Name: Denise Salas        Date: 2019  : 1985   yes Patient  Verified  Visit #:     Insurance: Payor: Anali Robins / Plan: [employer] H[employer] / Product Type: HMO /      In time: 966 Out time: 939   Total Treatment Time: 69     Medicare/BCBS Time Tracking (below)   Total Timed Codes (min):  na 1:1 Treatment Time:  na     TREATMENT AREA =  Piriformis muscle pain [M79.18]    SUBJECTIVE  Pain Level (on 0 to 10 scale):  0  / 10   Medication Changes/New allergies or changes in medical history, any new surgeries or procedures?    no  If yes, update Summary List   Subjective Functional Status/Changes:  []  No changes reported     Patient reports a good improvement in symptoms since the start of therapy. Patient states she has learned the tools to utilize in order to reduce and manage her pain. OBJECTIVE    54 min Therapeutic Exercise:  [x]  See flow sheet   Rationale:      increase ROM and increase strength to improve the patients ability to perform walking activities. 15 min Manual Therapy: STM to B glute med, B QL   Rationale:      decrease pain, increase ROM, increase tissue extensibility and decrease trigger points to improve patient's ability to perform recreational activities.      Billed With/As:   [x] TE   [] TA   [] Neuro   [] Self Care Patient Education: [x] Review HEP    [] Progressed/Changed HEP based on:   [] positioning   [] body mechanics   [] transfers   [] heat/ice application    [] other:      Other Objective/Functional Measures:    See DC note     Post Treatment Pain Level (on 0 to 10) scale:   1  / 10     ASSESSMENT  Assessment/Changes in Function:     See DC note           Progress toward goals / Updated goals:    See DCnote     PLAN  []  Upgrade activities as tolerated no Continue plan of care   [x]  Discharge due to :    []  Other:      Therapist: David Bucio PT    Date: 2019 Time: 10:48 AM     Future Appointments   Date Time Provider Alysha Anjelica   7/19/2019 10:00 AM Toni Bloch, MD [address] [place] [address]

## 2020-02-05 ENCOUNTER — OFFICE VISIT (OUTPATIENT)
Dept: FAMILY MEDICINE CLINIC | Age: 35
End: 2020-02-05

## 2020-02-05 ENCOUNTER — HOSPITAL ENCOUNTER (OUTPATIENT)
Dept: LAB | Age: 35
Discharge: HOME | End: 2020-02-05
Payer: MEDICAID

## 2020-02-05 VITALS
TEMPERATURE: 98.6 F | RESPIRATION RATE: 16 BRPM | DIASTOLIC BLOOD PRESSURE: 62 MMHG | BODY MASS INDEX: 23.26 KG/M2 | SYSTOLIC BLOOD PRESSURE: 107 MMHG | OXYGEN SATURATION: 99 % | WEIGHT: 123.2 LBS | HEIGHT: 61 IN | HEART RATE: 68 BPM

## 2020-02-05 DIAGNOSIS — F32.A DEPRESSION, UNSPECIFIED DEPRESSION TYPE: ICD-10-CM

## 2020-02-05 DIAGNOSIS — Z11.3 SCREENING FOR STD (SEXUALLY TRANSMITTED DISEASE): Primary | ICD-10-CM

## 2020-02-05 DIAGNOSIS — Z11.3 SCREENING FOR STD (SEXUALLY TRANSMITTED DISEASE): ICD-10-CM

## 2020-02-05 PROCEDURE — 86803 HEPATITIS C AB TEST: CPT

## 2020-02-05 PROCEDURE — 36415 COLL VENOUS BLD VENIPUNCTURE: CPT

## 2020-02-05 PROCEDURE — 87389 HIV-1 AG W/HIV-1&-2 AB AG IA: CPT

## 2020-02-05 PROCEDURE — 87340 HEPATITIS B SURFACE AG IA: CPT

## 2020-02-05 PROCEDURE — 87491 CHLMYD TRACH DNA AMP PROBE: CPT

## 2020-02-05 PROCEDURE — 86780 TREPONEMA PALLIDUM: CPT

## 2020-02-05 NOTE — PROGRESS NOTES
Светлана Galvez presents today for   Chief Complaint   Patient presents with    Exposure to STD       Is someone accompanying this pt? no    Is the patient using any DME equipment during OV? no    Depression Screening:  3 most recent PHQ Screens 2/5/2020   Little interest or pleasure in doing things Not at all   Feeling down, depressed, irritable, or hopeless Not at all   Total Score PHQ 2 0       Learning Assessment:  Learning Assessment 2/5/2020   PRIMARY LEARNER Patient   PRIMARY LANGUAGE ENGLISH   LEARNER PREFERENCE PRIMARY DEMONSTRATION     READING     VIDEOS     LISTENING     PICTURES   ANSWERED BY patient   RELATIONSHIP SELF       Abuse Screening:  No flowsheet data found. Fall Risk  No flowsheet data found. Health Maintenance reviewed and discussed and ordered per Provider. Health Maintenance Due   Topic Date Due    DTaP/Tdap/Td series (1 - Tdap) 12/15/1998    PAP AKA CERVICAL CYTOLOGY  12/15/2008    Influenza Age 5 to Adult  08/01/2019   Patient declines vaccines. Patient states her last pap smear was approximately 2-3 years. She is unsure. Advised she can schedule WWE for next visit. Pt currently taking Antiplatelet therapy? no    Coordination of Care:  1. Have you been to the ER, urgent care clinic since your last visit? Hospitalized since your last visit? no    2. Have you seen or consulted any other health care providers outside of the [address] [place] [address] since your last visit? Include any pap smears or colon screening.  no

## 2020-02-05 NOTE — PROGRESS NOTES
Jose Miguel Nettles     Chief Complaint   Patient presents with    Exposure to STD     Vitals:    02/05/20 1435   BP: 107/62   Pulse: 68   Resp: 16   Temp: 98.6 °F (37 °C)   TempSrc: Oral   SpO2: 99%   Weight: 123 lb 3.2 oz (55.9 kg)   Height: 5' 1\" (1.549 m)   PainSc:   0 - No pain   LMP: 02/04/2020         HPI: Jose Miguel Nettles is here for evaluation for possible STD exposure she would like to have a STD panel and exposure. Patient has no vaginal discharge no pelvic pain no fever no burning urination. She has a history of depression she was started on Lexapro in July of last year and she has not taken the medication, she had some online psychotherapy and counseling, and she become more outgoing and social traveling, her depression has improved she has no depressive symptoms now no suicidal thoughts, she would like to proceed with further counseling and psychotherapy, does not want to consider medication at this time. No past medical history on file. Past Surgical History:   Procedure Laterality Date    HX WISDOM TEETH EXTRACTION       Social History     Tobacco Use    Smoking status: Never Smoker    Smokeless tobacco: Never Used   Substance Use Topics    Alcohol use: Yes     Alcohol/week: 1.0 standard drinks     Types: 1 Glasses of wine per week     Comment: occasionally        Family History   Problem Relation Age of Onset    Hypertension Mother     Diabetes Maternal Grandmother        Review of Systems   Constitutional: Negative for chills, fever, malaise/fatigue and weight loss. HENT: Negative for congestion, ear discharge, ear pain, hearing loss and nosebleeds. Eyes: Negative for blurred vision, double vision and discharge. Respiratory: Negative for cough. Cardiovascular: Negative for chest pain, palpitations, claudication and leg swelling. Gastrointestinal: Negative for abdominal pain, constipation, diarrhea, nausea and vomiting.    Genitourinary: Negative for dysuria, frequency and urgency. Musculoskeletal: Negative for back pain, joint pain, myalgias and neck pain. Skin: Negative for itching and rash. Neurological: Negative for dizziness, tingling, sensory change, speech change, focal weakness, weakness and headaches. Psychiatric/Behavioral: Negative for depression, hallucinations, substance abuse and suicidal ideas. The patient is not nervous/anxious. Physical Exam  Vitals signs and nursing note reviewed. Constitutional:       General: She is not in acute distress. Appearance: She is well-developed. She is not diaphoretic. HENT:      Head: Normocephalic and atraumatic. Mouth/Throat:      Pharynx: No oropharyngeal exudate. Eyes:      General:         Right eye: No discharge. Left eye: No discharge. Conjunctiva/sclera: Conjunctivae normal.      Pupils: Pupils are equal, round, and reactive to light. Neck:      Thyroid: No thyromegaly. Cardiovascular:      Rate and Rhythm: Normal rate and regular rhythm. Heart sounds: No murmur. Pulmonary:      Effort: Pulmonary effort is normal. No respiratory distress. Breath sounds: Normal breath sounds. No wheezing or rales. Chest:      Chest wall: No tenderness. Abdominal:      General: There is no distension. Palpations: Abdomen is soft. Tenderness: There is no abdominal tenderness. There is no rebound. Musculoskeletal: Normal range of motion. General: No tenderness or deformity. Lymphadenopathy:      Cervical: No cervical adenopathy. Skin:     General: Skin is warm and dry. Coloration: Skin is not pale. Findings: No erythema or rash. Neurological:      Mental Status: She is alert and oriented to person, place, and time. Cranial Nerves: No cranial nerve deficit. Coordination: Coordination normal.   Psychiatric:         Behavior: Behavior normal.         Thought Content:  Thought content normal.         Judgment: Judgment normal.          Assessment and plan     Plan of care has been discussed with the patient, he agrees to the plan and verbalized understanding. All his questions were answered  More than 50% of the time spent in this visit was counseling the patient about  illness and treatment options         1. Depression, unspecified depression type  Improving doing well without medication     2. Screening for STD (sexually transmitted disease)    - CHLAMYDIA/GC PCR; Future  - T PALLIDUM AB; Future  - HEP B SURFACE AG; Future  - HEPATITIS C AB; Future  - HIV 1/2 AG/AB, 4TH GENERATION,W RFLX CONFIRM; Future    Current Outpatient Medications   Medication Sig Dispense Refill    escitalopram oxalate (LEXAPRO) 5 mg tablet Take 1 Tab by mouth daily. 30 Tab 0       There are no active problems to display for this patient. Results for orders placed or performed during the hospital encounter of 05/14/19   CBC WITH AUTOMATED DIFF   Result Value Ref Range    WBC 4.8 4.6 - 13.2 K/uL    RBC 4.05 (L) 4.20 - 5.30 M/uL    HGB 12.6 12.0 - 16.0 g/dL    HCT 38.7 35.0 - 45.0 %    MCV 95.6 74.0 - 97.0 FL    MCH 31.1 24.0 - 34.0 PG    MCHC 32.6 31.0 - 37.0 g/dL    RDW 13.7 11.6 - 14.5 %    PLATELET 403 535 - 106 K/uL    MPV 9.8 9.2 - 11.8 FL    NEUTROPHILS 49 40 - 73 %    LYMPHOCYTES 34 21 - 52 %    MONOCYTES 9 3 - 10 %    EOSINOPHILS 7 (H) 0 - 5 %    BASOPHILS 1 0 - 2 %    ABS. NEUTROPHILS 2.4 1.8 - 8.0 K/UL    ABS. LYMPHOCYTES 1.7 0.9 - 3.6 K/UL    ABS. MONOCYTES 0.4 0.05 - 1.2 K/UL    ABS. EOSINOPHILS 0.4 0.0 - 0.4 K/UL    ABS.  BASOPHILS 0.0 0.0 - 0.1 K/UL    DF AUTOMATED     METABOLIC PANEL, COMPREHENSIVE   Result Value Ref Range    Sodium 139 136 - 145 mmol/L    Potassium 4.7 3.5 - 5.5 mmol/L    Chloride 107 100 - 108 mmol/L    CO2 25 21 - 32 mmol/L    Anion gap 7 3.0 - 18 mmol/L    Glucose 92 74 - 99 mg/dL    BUN 11 7.0 - 18 MG/DL    Creatinine 0.68 0.6 - 1.3 MG/DL    BUN/Creatinine ratio 16 12 - 20      GFR est AA >60 >60 ml/min/1.73m2    GFR est non-AA >60 >60 ml/min/1.73m2    Calcium 8.4 (L) 8.5 - 10.1 MG/DL    Bilirubin, total 0.3 0.2 - 1.0 MG/DL    ALT (SGPT) 25 13 - 56 U/L    AST (SGOT) 17 15 - 37 U/L    Alk. phosphatase 44 (L) 45 - 117 U/L    Protein, total 7.2 6.4 - 8.2 g/dL    Albumin 4.1 3.4 - 5.0 g/dL    Globulin 3.1 2.0 - 4.0 g/dL    A-G Ratio 1.3 0.8 - 1.7     LIPID PANEL   Result Value Ref Range    LIPID PROFILE          Cholesterol, total 192 <200 MG/DL    Triglyceride 55 <150 MG/DL    HDL Cholesterol 67 (H) 40 - 60 MG/DL    LDL, calculated 114 (H) 0 - 100 MG/DL    VLDL, calculated 11 MG/DL    CHOL/HDL Ratio 2.9 0 - 5.0       No visits with results within 3 Month(s) from this visit. Latest known visit with results is:   Hospital Outpatient Visit on 05/14/2019   Component Date Value Ref Range Status    WBC 05/14/2019 4.8  4.6 - 13.2 K/uL Final    RBC 05/14/2019 4.05* 4.20 - 5.30 M/uL Final    HGB 05/14/2019 12.6  12.0 - 16.0 g/dL Final    HCT 05/14/2019 38.7  35.0 - 45.0 % Final    MCV 05/14/2019 95.6  74.0 - 97.0 FL Final    MCH 05/14/2019 31.1  24.0 - 34.0 PG Final    MCHC 05/14/2019 32.6  31.0 - 37.0 g/dL Final    RDW 05/14/2019 13.7  11.6 - 14.5 % Final    PLATELET 55/82/6575 413  135 - 420 K/uL Final    MPV 05/14/2019 9.8  9.2 - 11.8 FL Final    NEUTROPHILS 05/14/2019 49  40 - 73 % Final    LYMPHOCYTES 05/14/2019 34  21 - 52 % Final    MONOCYTES 05/14/2019 9  3 - 10 % Final    EOSINOPHILS 05/14/2019 7* 0 - 5 % Final    BASOPHILS 05/14/2019 1  0 - 2 % Final    ABS. NEUTROPHILS 05/14/2019 2.4  1.8 - 8.0 K/UL Final    ABS. LYMPHOCYTES 05/14/2019 1.7  0.9 - 3.6 K/UL Final    ABS. MONOCYTES 05/14/2019 0.4  0.05 - 1.2 K/UL Final    ABS. EOSINOPHILS 05/14/2019 0.4  0.0 - 0.4 K/UL Final    ABS.  BASOPHILS 05/14/2019 0.0  0.0 - 0.1 K/UL Final    DF 05/14/2019 AUTOMATED    Final    Sodium 05/14/2019 139  136 - 145 mmol/L Final    Potassium 05/14/2019 4.7  3.5 - 5.5 mmol/L Final    Chloride 05/14/2019 107  100 - 108 mmol/L Final    CO2 05/14/2019 25  21 - 32 mmol/L Final    Anion gap 05/14/2019 7  3.0 - 18 mmol/L Final    Glucose 05/14/2019 92  74 - 99 mg/dL Final    BUN 05/14/2019 11  7.0 - 18 MG/DL Final    Creatinine 05/14/2019 0.68  0.6 - 1.3 MG/DL Final    BUN/Creatinine ratio 05/14/2019 16  12 - 20   Final    GFR est AA 05/14/2019 >60  >60 ml/min/1.73m2 Final    GFR est non-AA 05/14/2019 >60  >60 ml/min/1.73m2 Final    Comment: (NOTE)  Estimated GFR is calculated using the Modification of Diet in Renal   Disease (MDRD) Study equation, reported for both  Americans   (GFRAA) and non- Americans (GFRNA), and normalized to 1.73m2   body surface area. The physician must decide which value applies to   the patient. The MDRD study equation should only be used in   individuals age 25 or older. It has not been validated for the   following: pregnant women, patients with serious comorbid conditions,   or on certain medications, or persons with extremes of body size,   muscle mass, or nutritional status.  Calcium 05/14/2019 8.4* 8.5 - 10.1 MG/DL Final    Bilirubin, total 05/14/2019 0.3  0.2 - 1.0 MG/DL Final    ALT (SGPT) 05/14/2019 25  13 - 56 U/L Final    AST (SGOT) 05/14/2019 17  15 - 37 U/L Final    Alk. phosphatase 05/14/2019 44* 45 - 117 U/L Final    Protein, total 05/14/2019 7.2  6.4 - 8.2 g/dL Final    Albumin 05/14/2019 4.1  3.4 - 5.0 g/dL Final    Globulin 05/14/2019 3.1  2.0 - 4.0 g/dL Final    A-G Ratio 05/14/2019 1.3  0.8 - 1.7   Final    LIPID PROFILE 05/14/2019        Final    Cholesterol, total 05/14/2019 192  <200 MG/DL Final    Triglyceride 05/14/2019 55  <150 MG/DL Final    Comment: The drugs N-acetylcysteine (NAC) and  Metamiszole have been found to cause falsely  low results in this chemical assay. Please  be sure to submit blood samples obtained  BEFORE administration of either of these  drugs to assure correct results.       HDL Cholesterol 05/14/2019 67* 40 - 60 MG/DL Final    LDL, calculated 05/14/2019 114* 0 - 100 MG/DL Final    VLDL, calculated 05/14/2019 11  MG/DL Final    CHOL/HDL Ratio 05/14/2019 2.9  0 - 5.0   Final          Follow-up and Dispositions    · Return for as per results.

## 2020-02-06 LAB
C TRACH RRNA SPEC QL NAA+PROBE: NEGATIVE
HBV SURFACE AG SER QL: <0.1 INDEX
HBV SURFACE AG SER QL: NEGATIVE
HCV AB SER IA-ACNC: 0.06 INDEX
HCV AB SERPL QL IA: NEGATIVE
HCV COMMENT,HCGAC: NORMAL
N GONORRHOEA RRNA SPEC QL NAA+PROBE: NEGATIVE
SPECIMEN SOURCE: NORMAL
T PALLIDUM AB SER QL IA: NONREACTIVE

## 2020-02-07 LAB
HIV 1+2 AB+HIV1 P24 AG SERPL QL IA: NONREACTIVE
HIV12 RESULT COMMENT, HHIVC: NORMAL

## 2020-02-11 NOTE — PROGRESS NOTES
3x Attempted to contact pt to discuss results, LVM for pt to return call. Will send letter with normal results.